# Patient Record
Sex: MALE | Race: WHITE | NOT HISPANIC OR LATINO | URBAN - NONMETROPOLITAN AREA
[De-identification: names, ages, dates, MRNs, and addresses within clinical notes are randomized per-mention and may not be internally consistent; named-entity substitution may affect disease eponyms.]

---

## 2019-10-17 ENCOUNTER — IMPORTED ENCOUNTER (OUTPATIENT)
Dept: URBAN - NONMETROPOLITAN AREA CLINIC 1 | Facility: CLINIC | Age: 64
End: 2019-10-17

## 2019-10-17 PROBLEM — H35.372: Noted: 2019-10-17

## 2019-10-17 PROBLEM — H43.811: Noted: 2019-10-17

## 2019-10-17 PROCEDURE — 99203 OFFICE O/P NEW LOW 30 MIN: CPT

## 2019-10-17 NOTE — PATIENT DISCUSSION
PVD OD with floaters OU - Discussed findings of exam in detail with the patient. - The risk of retinal detachment in patients with PVDs was discussed with the patient and the warning signs of retinal detachment were carefully reviewed with the patient. - Pt advised to not lift anything heavy keep head upright do not bend over at waist for 2 weeks from beginning of symptoms - The patient was warned to return to the office or contact the ophthalmologist on call immediately if they experience signs of retinal detachment or changes noted in vision from today.  - Continue to monitor ERM OD- Discussed diagnosis in detail with patient - Discussed signs and symptoms associated- Recommend eye vitamins daily such as PreserVision AREDS ll - Recommend monitoring AG at home and to call or come in ASAP if any changes in vision noted from today- Continue to monitor

## 2019-10-31 ENCOUNTER — IMPORTED ENCOUNTER (OUTPATIENT)
Dept: URBAN - NONMETROPOLITAN AREA CLINIC 1 | Facility: CLINIC | Age: 64
End: 2019-10-31

## 2019-10-31 PROBLEM — H35.372: Noted: 2019-10-17

## 2019-10-31 PROBLEM — H43.811: Noted: 2019-10-17

## 2019-10-31 PROBLEM — H52.03: Noted: 2019-10-31

## 2019-10-31 PROBLEM — H52.4: Noted: 2019-10-31

## 2019-10-31 PROBLEM — H52.221: Noted: 2019-10-31

## 2019-10-31 PROCEDURE — 92015 DETERMINE REFRACTIVE STATE: CPT

## 2019-10-31 PROCEDURE — 99214 OFFICE O/P EST MOD 30 MIN: CPT

## 2019-10-31 NOTE — PATIENT DISCUSSION
PVD OD with floaters OU -  Discussed findings of exam in detail with the patient. -  no holes tears or detachments seen stable-  The risk of retinal detachment in patients with PVDs was discussed with the patient and the warning signs of retinal detachment were carefully reviewed with the patient. -  Patient may return to normal activities at this point-  RTC 6 weeks f/u or prnERM OD- Discussed diagnosis in detail with patient - Discussed signs and symptoms associated- Recommend eye vitamins daily such as PreserVision AREDS ll - Recommend monitoring AG at home and to call or come in ASAP if any changes in vision noted from today- Continue to monitor yearly or prnCompound Hyperopic Astigmatism OD/Simple Hyperopia OS w/Presbyopia-  discussed findings w/patient-  new spectacle Rx issued-  continue to monitor yearly or prn; 's Notes:  10/31/2019DFE 10/31/2019

## 2019-11-01 NOTE — PATIENT DISCUSSION
This visual field clearly demonstrated a minimum of 33% loss of upper field of vision OU, with upper lid skin in repose and elevated by taping of the lid to demonstrate potential correction. This field shows that taping the lids significantly improved this patient's superior field of vision by approximately 31%, OU.

## 2019-12-13 ENCOUNTER — IMPORTED ENCOUNTER (OUTPATIENT)
Dept: URBAN - NONMETROPOLITAN AREA CLINIC 1 | Facility: CLINIC | Age: 64
End: 2019-12-13

## 2019-12-13 PROCEDURE — 99213 OFFICE O/P EST LOW 20 MIN: CPT

## 2019-12-13 NOTE — PATIENT DISCUSSION
PVD OD-  Discussed findings of exam in detail with the patient. -  no holes tears or detachments seen stable from previous. No DFE OS-  The risk of retinal detachment in patients with PVDs was discussed with the patient and the warning signs of retinal detachment were carefully reviewed with the patient. -  Patient may return to normal activities at this point-  RTC 3 month f/u PVD w/DFE OD; 's Notes: MR 10/31/2019DFE 12/13/2019 OD only

## 2021-07-28 ENCOUNTER — IMPORTED ENCOUNTER (OUTPATIENT)
Dept: URBAN - NONMETROPOLITAN AREA CLINIC 1 | Facility: CLINIC | Age: 66
End: 2021-07-28

## 2021-07-28 PROCEDURE — 92015 DETERMINE REFRACTIVE STATE: CPT

## 2021-07-28 PROCEDURE — 92014 COMPRE OPH EXAM EST PT 1/>: CPT

## 2021-07-28 NOTE — PATIENT DISCUSSION
PVD OD new PVD OS today-  Discussed findings of exam in detail with the patient. -  no holes tears or detachments seen stable from previous. No DFE OS-  The risk of retinal detachment in patients with PVDs was discussed with the patient and the warning signs of retinal detachment were carefully reviewed with the patient. -  Patient may return to normal activities at this point-  RTC 1 year or prn w/changesERM OS-  discussed findings w/patient-  need to get OCT Mac Simple Hyperopia w/ Presbyopia -  discussed findings w/patient -  new spectacle Rx issued -  continue to monitor; 's Notes:  7/28/2021DFE 7/28/2021

## 2022-04-09 ASSESSMENT — TONOMETRY
OD_IOP_MMHG: 16
OS_IOP_MMHG: 16
OD_IOP_MMHG: 15
OS_IOP_MMHG: 16
OS_IOP_MMHG: 16
OD_IOP_MMHG: 18
OD_IOP_MMHG: 17
OS_IOP_MMHG: 16

## 2022-04-09 ASSESSMENT — VISUAL ACUITY
OD_SC: 20/20
OU_SC: 20/25
OS_SC: 20/20-
OD_CC: 20/20-1
OD_SC: 20/50
OD_SC: 20/20
OS_CC: 20/25+
OU_CC: J1+
OU_CC: 20/20-1
OU_CC: J1
OS_SC: 20/40
OS_SC: 20/30+2 BLURRY
OS_SC: 20/20